# Patient Record
Sex: FEMALE | Race: OTHER
[De-identification: names, ages, dates, MRNs, and addresses within clinical notes are randomized per-mention and may not be internally consistent; named-entity substitution may affect disease eponyms.]

---

## 2019-07-02 ENCOUNTER — HOSPITAL ENCOUNTER (OUTPATIENT)
Dept: HOSPITAL 93 - NUCLEAR | Age: 54
Discharge: HOME | End: 2019-07-02
Attending: INTERNAL MEDICINE
Payer: COMMERCIAL

## 2019-07-02 DIAGNOSIS — C85.13: Primary | ICD-10-CM

## 2019-07-02 PROCEDURE — 78815 PET IMAGE W/CT SKULL-THIGH: CPT

## 2019-07-09 ENCOUNTER — HOSPITAL ENCOUNTER (OUTPATIENT)
Dept: HOSPITAL 93 - CIR.AMB | Age: 54
Discharge: HOME | End: 2019-07-09
Attending: SPECIALIST
Payer: COMMERCIAL

## 2019-07-09 DIAGNOSIS — C82.33: Primary | ICD-10-CM

## 2019-07-09 PROCEDURE — 36561 INSERT TUNNELED CV CATH: CPT

## 2020-02-05 ENCOUNTER — HOSPITAL ENCOUNTER (OUTPATIENT)
Dept: HOSPITAL 93 - NUCLEAR | Age: 55
Discharge: HOME | End: 2020-02-05
Attending: INTERNAL MEDICINE
Payer: COMMERCIAL

## 2020-02-05 DIAGNOSIS — C82.07: ICD-10-CM

## 2020-02-05 DIAGNOSIS — C82.03: Primary | ICD-10-CM

## 2020-02-05 PROCEDURE — 78815 PET IMAGE W/CT SKULL-THIGH: CPT

## 2020-12-13 ENCOUNTER — HOSPITAL ENCOUNTER (EMERGENCY)
Dept: HOSPITAL 93 - ER | Age: 55
Discharge: HOME | End: 2020-12-13
Payer: COMMERCIAL

## 2020-12-13 VITALS — BODY MASS INDEX: 31.66 KG/M2 | WEIGHT: 197 LBS | HEIGHT: 66 IN

## 2020-12-13 DIAGNOSIS — U07.1: Primary | ICD-10-CM

## 2020-12-13 DIAGNOSIS — R06.02: ICD-10-CM

## 2020-12-13 DIAGNOSIS — J16.8: ICD-10-CM

## 2020-12-14 ENCOUNTER — HOSPITAL ENCOUNTER (INPATIENT)
Dept: HOSPITAL 93 - ER | Age: 55
LOS: 6 days | Discharge: HOME | DRG: 177 | End: 2020-12-20
Attending: INTERNAL MEDICINE | Admitting: INTERNAL MEDICINE
Payer: COMMERCIAL

## 2020-12-14 VITALS — WEIGHT: 197 LBS | BODY MASS INDEX: 31.66 KG/M2 | HEIGHT: 66 IN

## 2020-12-14 DIAGNOSIS — E86.0: ICD-10-CM

## 2020-12-14 DIAGNOSIS — U07.1: Primary | ICD-10-CM

## 2020-12-14 DIAGNOSIS — J12.89: ICD-10-CM

## 2020-12-14 DIAGNOSIS — R09.02: ICD-10-CM

## 2020-12-14 DIAGNOSIS — I10: ICD-10-CM

## 2020-12-14 DIAGNOSIS — C85.90: ICD-10-CM

## 2020-12-15 PROCEDURE — 4A033R1 MEASUREMENT OF ARTERIAL SATURATION, PERIPHERAL, PERCUTANEOUS APPROACH: ICD-10-PCS | Performed by: INTERNAL MEDICINE

## 2020-12-15 PROCEDURE — 8E0ZXY6 ISOLATION: ICD-10-PCS | Performed by: INTERNAL MEDICINE

## 2020-12-15 PROCEDURE — 3E0F7SF INTRODUCTION OF OTHER GAS INTO RESPIRATORY TRACT, VIA NATURAL OR ARTIFICIAL OPENING: ICD-10-PCS | Performed by: INTERNAL MEDICINE

## 2020-12-15 PROCEDURE — 5A09557 ASSISTANCE WITH RESPIRATORY VENTILATION, GREATER THAN 96 CONSECUTIVE HOURS, CONTINUOUS POSITIVE AIRWAY PRESSURE: ICD-10-PCS | Performed by: INTERNAL MEDICINE

## 2020-12-16 PROCEDURE — XW033E5 INTRODUCTION OF REMDESIVIR ANTI-INFECTIVE INTO PERIPHERAL VEIN, PERCUTANEOUS APPROACH, NEW TECHNOLOGY GROUP 5: ICD-10-PCS | Performed by: INTERNAL MEDICINE

## 2020-12-16 PROCEDURE — 4A12X4Z MONITORING OF CARDIAC ELECTRICAL ACTIVITY, EXTERNAL APPROACH: ICD-10-PCS | Performed by: INTERNAL MEDICINE

## 2020-12-28 ENCOUNTER — HOSPITAL ENCOUNTER (INPATIENT)
Dept: HOSPITAL 93 - ER | Age: 55
LOS: 25 days | DRG: 177 | End: 2021-01-22
Attending: INTERNAL MEDICINE | Admitting: INTERNAL MEDICINE
Payer: COMMERCIAL

## 2020-12-28 VITALS — BODY MASS INDEX: 47.09 KG/M2 | HEIGHT: 66 IN | WEIGHT: 293 LBS

## 2020-12-28 DIAGNOSIS — D63.8: ICD-10-CM

## 2020-12-28 DIAGNOSIS — D84.9: ICD-10-CM

## 2020-12-28 DIAGNOSIS — Z20.822: ICD-10-CM

## 2020-12-28 DIAGNOSIS — U07.1: Primary | ICD-10-CM

## 2020-12-28 DIAGNOSIS — I80.8: ICD-10-CM

## 2020-12-28 DIAGNOSIS — C85.90: ICD-10-CM

## 2020-12-28 DIAGNOSIS — Z92.21: ICD-10-CM

## 2020-12-28 DIAGNOSIS — R09.02: ICD-10-CM

## 2020-12-28 DIAGNOSIS — J12.89: ICD-10-CM

## 2020-12-29 PROCEDURE — XW033E5 INTRODUCTION OF REMDESIVIR ANTI-INFECTIVE INTO PERIPHERAL VEIN, PERCUTANEOUS APPROACH, NEW TECHNOLOGY GROUP 5: ICD-10-PCS | Performed by: INTERNAL MEDICINE

## 2020-12-31 PROCEDURE — B44HZZZ ULTRASONOGRAPHY OF BILATERAL LOWER EXTREMITY ARTERIES: ICD-10-PCS | Performed by: NUCLEAR MEDICINE

## 2021-01-11 PROCEDURE — BW21YZZ COMPUTERIZED TOMOGRAPHY (CT SCAN) OF ABDOMEN AND PELVIS USING OTHER CONTRAST: ICD-10-PCS | Performed by: RADIOLOGY

## 2021-01-14 PROCEDURE — CW2YYZZ TOMOGRAPHIC (TOMO) NUCLEAR MEDICINE IMAGING OF MULTIPLE ANATOMICAL REGIONS USING OTHER RADIONUCLIDE: ICD-10-PCS | Performed by: NUCLEAR MEDICINE

## 2021-02-01 ENCOUNTER — HOSPITAL ENCOUNTER (OUTPATIENT)
Dept: HOSPITAL 93 - RAD | Age: 56
Discharge: HOME | End: 2021-02-01
Attending: INTERNAL MEDICINE
Payer: COMMERCIAL

## 2021-02-01 DIAGNOSIS — U07.1: ICD-10-CM

## 2021-02-01 DIAGNOSIS — J12.81: Primary | ICD-10-CM

## 2021-02-04 ENCOUNTER — HOSPITAL ENCOUNTER (INPATIENT)
Dept: HOSPITAL 93 - ER | Age: 56
LOS: 5 days | Discharge: HOME | DRG: 177 | End: 2021-02-09
Attending: INTERNAL MEDICINE | Admitting: INTERNAL MEDICINE
Payer: COMMERCIAL

## 2021-02-04 VITALS — WEIGHT: 192 LBS | BODY MASS INDEX: 30.86 KG/M2 | HEIGHT: 66 IN

## 2021-02-04 DIAGNOSIS — J12.82: ICD-10-CM

## 2021-02-04 DIAGNOSIS — D64.9: ICD-10-CM

## 2021-02-04 DIAGNOSIS — B97.29: ICD-10-CM

## 2021-02-04 DIAGNOSIS — U07.1: Primary | ICD-10-CM

## 2021-02-04 DIAGNOSIS — C85.90: ICD-10-CM

## 2021-02-04 DIAGNOSIS — R09.02: ICD-10-CM

## 2021-02-04 PROCEDURE — BW24ZZZ COMPUTERIZED TOMOGRAPHY (CT SCAN) OF CHEST AND ABDOMEN: ICD-10-PCS | Performed by: RADIOLOGY

## 2021-02-04 NOTE — NUR
PACIENTE EVALUADA POR DR. SHRESTHA, PTE ALERTA Y ORIENTA SE ORIENTA SOBRE
TRATAMIENTO. RN JOSUÉ.MAKENNA REALIZA MICHAEL DE MUESTRAS Y LAS ENVIA AL LABORATORIO.SE
REALIZA EKG. PACIENTE EN ESPERA DE CT. SE MANTIENE BAJO OBSERVACION.

## 2021-02-04 NOTE — NUR
SE RECIBE PTE FEMENIA DE 55 YRS ALERTA CONCIENTE Y TRANQUILA EN LA UNIDAD DE
SEC-K EN CAMA CON BARANDAS ELEVADA , SE LE MICHAEL S/V LA CUAL SE MANTIENE
ESTABLE. SE MANTIENE RICARDA DE DOLOR AL MOMENTO.SE OBSERVA CON H/LPATENTE Y SE
MANTIENE BAJO OBSERVACION POR CAMBIOS.

## 2021-02-04 NOTE — NUR
SE RECIBE PACIENTE FEMINA DE 55 ANOS DE EDAD PACIENTE ALERTA,ESTABLA Y
ORIENTADA  SEGUIMIENTO QUE SE LE SEGUIRA EN EL HOSPITAL Y ESTA REFIERE ENTENDER

## 2021-02-04 NOTE — NUR
PTE REFIERE COVID POSITIVO. 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 SE
PRESENTA JYOTI A DR PADILLA, SE MIDEN SIGNOS VITALES Y SE UBICA EN SECCION K.

## 2021-02-05 PROCEDURE — 4A12X45 MONITORING OF CARDIAC ELECTRICAL ACTIVITY, AMBULATORY, EXTERNAL APPROACH: ICD-10-PCS | Performed by: INTERNAL MEDICINE

## 2021-02-05 PROCEDURE — 3E0F7SF INTRODUCTION OF OTHER GAS INTO RESPIRATORY TRACT, VIA NATURAL OR ARTIFICIAL OPENING: ICD-10-PCS | Performed by: INTERNAL MEDICINE

## 2021-02-05 NOTE — NUR
7:00AM 
SE RECIBE PTE DEL TURNO ANTERIOR, ALERTA Y ORIENTADA X 3 ESFERAS, EN CAMA NIVEL
MAS BAJO, ANTONIO DE IDENTIFICACION Y BARANDAS ELEVADAS POR PRECAUCION. SE
OBSERVA CON CANULA NASAL A 3L/MIN, LA CUAL TOLERA MANTENIENDO BUEN PATRON
RESPIRATORIO, SATURANDO % KRUPA OXIMETRO DE PULSO. PIEL TIBIA AL TACTO.
H/L #20 EN MANO RT PATENTE Y RICARDA DE EDEMA O ERITEMA. PTE PENDIENTE A CONSULTA
CON DR CARLOS Y DR JAIMIE VINSON. 
 
7:38AM 
SE NOTIFICA VALOR PANICO A DR CARLOS Y TEMPERATURA DE .7 QUIEN ORDENA
[place]. 
SE MANTIENE BAJO OBSERVACION.

## 2021-09-30 ENCOUNTER — HOSPITAL ENCOUNTER (OUTPATIENT)
Dept: HOSPITAL 93 - NUCLEAR | Age: 56
Discharge: HOME | End: 2021-09-30
Attending: INTERNAL MEDICINE
Payer: COMMERCIAL

## 2021-09-30 DIAGNOSIS — C82.07: ICD-10-CM

## 2021-09-30 DIAGNOSIS — C82.03: Primary | ICD-10-CM

## 2021-09-30 PROCEDURE — 78816 PET IMAGE W/CT FULL BODY: CPT

## 2022-10-04 ENCOUNTER — HOSPITAL ENCOUNTER (OUTPATIENT)
Dept: HOSPITAL 93 - CIR.AMB | Age: 57
Discharge: HOME | End: 2022-10-04
Attending: SPECIALIST
Payer: COMMERCIAL

## 2022-10-04 VITALS — HEIGHT: 66 IN | WEIGHT: 182 LBS | BODY MASS INDEX: 29.25 KG/M2

## 2022-10-04 DIAGNOSIS — Z88.8: ICD-10-CM

## 2022-10-04 DIAGNOSIS — C82.33: Primary | ICD-10-CM

## 2022-10-04 DIAGNOSIS — I10: ICD-10-CM

## 2022-10-04 DIAGNOSIS — M19.90: ICD-10-CM

## 2022-10-04 DIAGNOSIS — Z20.822: ICD-10-CM
